# Patient Record
Sex: MALE | Race: WHITE | URBAN - METROPOLITAN AREA
[De-identification: names, ages, dates, MRNs, and addresses within clinical notes are randomized per-mention and may not be internally consistent; named-entity substitution may affect disease eponyms.]

---

## 2021-11-12 ENCOUNTER — OFFICE VISIT (OUTPATIENT)
Dept: PRIMARY CARE CLINIC | Age: 56
End: 2021-11-12
Payer: COMMERCIAL

## 2021-11-12 VITALS
DIASTOLIC BLOOD PRESSURE: 87 MMHG | WEIGHT: 140 LBS | HEIGHT: 70 IN | HEART RATE: 87 BPM | OXYGEN SATURATION: 98 % | BODY MASS INDEX: 20.04 KG/M2 | TEMPERATURE: 97.3 F | SYSTOLIC BLOOD PRESSURE: 132 MMHG

## 2021-11-12 DIAGNOSIS — J06.9 VIRAL URI: Primary | ICD-10-CM

## 2021-11-12 PROCEDURE — 99204 OFFICE O/P NEW MOD 45 MIN: CPT

## 2021-11-12 ASSESSMENT — PATIENT HEALTH QUESTIONNAIRE - PHQ9
SUM OF ALL RESPONSES TO PHQ9 QUESTIONS 1 & 2: 0
SUM OF ALL RESPONSES TO PHQ QUESTIONS 1-9: 0
SUM OF ALL RESPONSES TO PHQ QUESTIONS 1-9: 0
1. LITTLE INTEREST OR PLEASURE IN DOING THINGS: 0
SUM OF ALL RESPONSES TO PHQ QUESTIONS 1-9: 0
2. FEELING DOWN, DEPRESSED OR HOPELESS: 0

## 2021-11-12 NOTE — PROGRESS NOTES
[facility] [address] [place] [facility]  [address]  [facility] [address]    [facility] [address] [facility] [place] [facility]  [id number] [place] [address]  Dept: [phone]    Debbi Fulton is a 49 y.o. male New patient, who presents to the walk-in clinic today with conditions/complaints as noted below:    Chief Complaint   Patient presents with    Fatigue     pt has been having GI issues with fatigue with fever X 1 week         HPI:     Fatigue  This is a new problem. The current episode started in the past 7 days. The problem occurs constantly. The problem has been resolved. Associated symptoms include fatigue, a fever and nausea. Pertinent negatives include no abdominal pain, anorexia, arthralgias, change in bowel habit, chest pain, chills, congestion, coughing, diaphoresis, headaches, joint swelling, myalgias, neck pain, numbness, rash, sore throat, swollen glands, urinary symptoms, vertigo, visual change, vomiting or weakness. Nothing aggravates the symptoms. He has tried sleep and rest for the symptoms. The treatment provided no relief. Patient states that he started experiencing symptoms X 7 days ago but his symptoms has resolved. He is still experiencing fatigue. Declines COVID testing. History reviewed. No pertinent past medical history. No current outpatient medications on file. No current facility-administered medications for this visit. No Known Allergies    Review of Systems:     Review of Systems   Constitutional: Positive for fatigue and fever. Negative for chills and diaphoresis. HENT: Negative for congestion, ear pain, postnasal drip, rhinorrhea, sinus pressure, sinus pain, sneezing and sore throat. Eyes: Negative. Negative for pain, discharge and itching. Respiratory: Negative. Negative for cough, chest tightness, shortness of breath and wheezing. Cardiovascular: Negative.   Negative for chest pain, palpitations and leg swelling. Gastrointestinal: Positive for diarrhea and nausea. Negative for abdominal pain, anorexia, change in bowel habit, constipation and vomiting. Musculoskeletal: Negative for arthralgias, joint swelling, myalgias and neck pain. Skin: Negative. Negative for rash. Neurological: Negative. Negative for dizziness, vertigo, weakness, numbness and headaches. Physical Exam:      /87 (Site: Left Upper Arm, Position: Sitting, Cuff Size: Large Adult)   Pulse 87   Temp 97.3 °F (36.3 °C) (Tympanic)   Ht 5' 10\" (1.778 m)   Wt 140 lb (63.5 kg)   SpO2 98%   BMI 20.09 kg/m²     Physical Exam  Vitals reviewed. Constitutional:       Appearance: Normal appearance. He is normal weight. HENT:      Head: Normocephalic. Right Ear: Tympanic membrane, ear canal and external ear normal.      Left Ear: Tympanic membrane, ear canal and external ear normal.      Nose: Congestion present. Mouth/Throat:      Mouth: Mucous membranes are moist.      Pharynx: Oropharynx is clear. Eyes:      Conjunctiva/sclera: Conjunctivae normal.      Pupils: Pupils are equal, round, and reactive to light. Cardiovascular:      Rate and Rhythm: Normal rate and regular rhythm. Heart sounds: Normal heart sounds. Pulmonary:      Effort: Pulmonary effort is normal.      Breath sounds: Normal breath sounds. Abdominal:      General: Abdomen is flat. Bowel sounds are normal.      Palpations: Abdomen is soft. Musculoskeletal:      Cervical back: Normal range of motion and neck supple. Skin:     General: Skin is warm and dry. Capillary Refill: Capillary refill takes less than 2 seconds. Neurological:      General: No focal deficit present. Mental Status: He is alert and oriented to person, place, and time. Mental status is at baseline. Plan:          1. Viral URI     Discussed possible blood work/work up for fatigue but patient declines it at this time.    Follow Up Instructions: Return if symptoms worsen or fail to improve. No orders of the defined types were placed in this encounter. Patient and/or parent given educational materials - see patient instructions. Discussed use, benefit, and side effects of prescribed medications. All patient questions answered. Patient and/or parent voiced understanding.       Electronically signed by JAVIER Garcia 11/13/2021 at 9:40 PM

## 2021-11-12 NOTE — PATIENT INSTRUCTIONS
smoke or allow others to smoke around you. If you need help quitting, talk to your doctor about stop-smoking programs and medicines. These can increase your chances of quitting for good. When should you call for help? Call 911 anytime you think you may need emergency care. For example, call if:    · You have severe trouble breathing. Call your doctor now or seek immediate medical care if:    · You seem to be getting much sicker.     · You have new or worse trouble breathing.     · You have a new or higher fever.     · You have a new rash. Watch closely for changes in your health, and be sure to contact your doctor if:    · You have a new symptom, such as a sore throat, an earache, or sinus pain.     · You cough more deeply or more often, especially if you notice more mucus or a change in the color of your mucus.     · You do not get better as expected. Where can you learn more? Go to https://[employer]. org and sign in to your [employer] account. Enter M363 in the cafegive box to learn more about \"Upper Respiratory Infection (Cold): Care Instructions. \"     If you do not have an account, please click on the \"Sign Up Now\" link. Current as of: July 6, 2021               Content Version: 13.0  © 2006-2021 [employer]. Care instructions adapted under license by [facility] ([facility]). If you have questions about a medical condition or this instruction, always ask your healthcare professional. [place] [address] any warranty or liability for your use of this information.

## 2021-11-13 ASSESSMENT — ENCOUNTER SYMPTOMS
CHEST TIGHTNESS: 0
COUGH: 0
WHEEZING: 0
VISUAL CHANGE: 0
ABDOMINAL PAIN: 0
SWOLLEN GLANDS: 0
EYE DISCHARGE: 0
CHANGE IN BOWEL HABIT: 0
VOMITING: 0
EYE PAIN: 0
SORE THROAT: 0
SINUS PAIN: 0
RESPIRATORY NEGATIVE: 1
NAUSEA: 1
EYES NEGATIVE: 1
RHINORRHEA: 0
SINUS PRESSURE: 0
EYE ITCHING: 0
DIARRHEA: 1
SHORTNESS OF BREATH: 0
CONSTIPATION: 0